# Patient Record
Sex: MALE | Race: AMERICAN INDIAN OR ALASKA NATIVE
[De-identification: names, ages, dates, MRNs, and addresses within clinical notes are randomized per-mention and may not be internally consistent; named-entity substitution may affect disease eponyms.]

---

## 2019-11-13 ENCOUNTER — HOSPITAL ENCOUNTER (EMERGENCY)
Dept: HOSPITAL 5 - ED | Age: 34
Discharge: HOME | End: 2019-11-13
Payer: SELF-PAY

## 2019-11-13 VITALS — DIASTOLIC BLOOD PRESSURE: 90 MMHG | SYSTOLIC BLOOD PRESSURE: 144 MMHG

## 2019-11-13 DIAGNOSIS — Z79.899: ICD-10-CM
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
## 2019-11-13 DIAGNOSIS — S51.811A: Primary | ICD-10-CM
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
## 2019-11-13 DIAGNOSIS — Z98.890: ICD-10-CM
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
## 2019-11-13 DIAGNOSIS — F17.200: ICD-10-CM

## 2019-11-13 PROCEDURE — 90471 IMMUNIZATION ADMIN: CPT

## 2019-11-13 PROCEDURE — 90715 TDAP VACCINE 7 YRS/> IM: CPT
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
